# Patient Record
Sex: FEMALE | Race: WHITE | NOT HISPANIC OR LATINO | Employment: OTHER | URBAN - METROPOLITAN AREA
[De-identification: names, ages, dates, MRNs, and addresses within clinical notes are randomized per-mention and may not be internally consistent; named-entity substitution may affect disease eponyms.]

---

## 2017-09-19 ENCOUNTER — CONVERSION ENCOUNTER (OUTPATIENT)
Dept: MAMMOGRAPHY | Facility: HOSPITAL | Age: 72
End: 2017-09-19

## 2019-07-11 ENCOUNTER — HOSPITAL ENCOUNTER (OUTPATIENT)
Dept: MAMMOGRAPHY | Facility: HOSPITAL | Age: 74
Discharge: HOME | End: 2019-07-11
Attending: NURSE PRACTITIONER

## 2019-09-23 ENCOUNTER — TRANSCRIBE ORDERS (OUTPATIENT)
Dept: ADMINISTRATIVE | Facility: HOSPITAL | Age: 74
End: 2019-09-23

## 2019-09-23 DIAGNOSIS — H91.21 SUDDEN IDIOPATHIC HEARING LOSS OF RIGHT EAR, UNSPECIFIED HEARING STATUS ON CONTRALATERAL SIDE: ICD-10-CM

## 2019-09-23 DIAGNOSIS — H91.21 SUDDEN HEARING LOSS, RIGHT: Primary | ICD-10-CM

## 2019-10-08 ENCOUNTER — TRANSCRIBE ORDERS (OUTPATIENT)
Dept: ADMINISTRATIVE | Facility: HOSPITAL | Age: 74
End: 2019-10-08

## 2019-10-08 DIAGNOSIS — H91.21 SUDDEN HEARING LOSS, RIGHT: Primary | ICD-10-CM

## 2019-10-10 ENCOUNTER — HOSPITAL ENCOUNTER (OUTPATIENT)
Dept: MRI IMAGING | Facility: HOSPITAL | Age: 74
Discharge: HOME | End: 2019-10-10
Admitting: OTOLARYNGOLOGY

## 2019-10-10 DIAGNOSIS — H91.21 SUDDEN HEARING LOSS, RIGHT: ICD-10-CM

## 2019-10-10 PROCEDURE — 82565 ASSAY OF CREATININE: CPT

## 2019-10-10 PROCEDURE — 70553 MRI BRAIN STEM W/O & W/DYE: CPT

## 2019-10-10 PROCEDURE — 0 GADOBENATE DIMEGLUMINE 529 MG/ML SOLUTION: Performed by: OTOLARYNGOLOGY

## 2019-10-10 PROCEDURE — A9577 INJ MULTIHANCE: HCPCS | Performed by: OTOLARYNGOLOGY

## 2019-10-10 RX ADMIN — GADOBENATE DIMEGLUMINE 15 ML: 529 INJECTION, SOLUTION INTRAVENOUS at 09:48

## 2019-10-11 LAB — CREAT BLDA-MCNC: 0.8 MG/DL (ref 0.6–1.3)

## 2021-05-23 ENCOUNTER — TRANSCRIBE ORDERS (OUTPATIENT)
Dept: ADMINISTRATIVE | Facility: HOSPITAL | Age: 76
End: 2021-05-23

## 2021-05-23 DIAGNOSIS — Z12.31 OTHER SCREENING MAMMOGRAM: Primary | ICD-10-CM

## 2021-08-09 ENCOUNTER — HOSPITAL ENCOUNTER (OUTPATIENT)
Dept: MAMMOGRAPHY | Facility: HOSPITAL | Age: 76
Discharge: HOME | End: 2021-08-09
Admitting: FAMILY MEDICINE

## 2021-08-09 DIAGNOSIS — Z12.31 OTHER SCREENING MAMMOGRAM: ICD-10-CM

## 2021-08-09 PROCEDURE — 77067 SCR MAMMO BI INCL CAD: CPT | Performed by: RADIOLOGY

## 2021-08-09 PROCEDURE — 77063 BREAST TOMOSYNTHESIS BI: CPT | Performed by: RADIOLOGY

## 2021-08-09 PROCEDURE — 77067 SCR MAMMO BI INCL CAD: CPT

## 2021-08-09 PROCEDURE — 77063 BREAST TOMOSYNTHESIS BI: CPT

## 2022-09-12 ENCOUNTER — TRANSCRIBE ORDERS (OUTPATIENT)
Dept: ADMINISTRATIVE | Facility: HOSPITAL | Age: 77
End: 2022-09-12

## 2022-09-12 DIAGNOSIS — Z12.31 SCREENING MAMMOGRAM FOR BREAST CANCER: Primary | ICD-10-CM

## 2022-09-19 ENCOUNTER — HOSPITAL ENCOUNTER (OUTPATIENT)
Dept: MAMMOGRAPHY | Facility: HOSPITAL | Age: 77
Discharge: HOME | End: 2022-09-19
Admitting: FAMILY MEDICINE

## 2022-09-19 DIAGNOSIS — Z12.31 SCREENING MAMMOGRAM FOR BREAST CANCER: ICD-10-CM

## 2022-09-19 PROCEDURE — 77067 SCR MAMMO BI INCL CAD: CPT

## 2024-05-09 ENCOUNTER — TRANSCRIBE ORDERS (OUTPATIENT)
Dept: ADMINISTRATIVE | Facility: HOSPITAL | Age: 79
End: 2024-05-09
Payer: MEDICARE

## 2024-05-09 DIAGNOSIS — Z12.31 VISIT FOR SCREENING MAMMOGRAM: Primary | ICD-10-CM

## 2024-06-21 ENCOUNTER — TRANSCRIBE ORDERS (OUTPATIENT)
Dept: ADMINISTRATIVE | Facility: HOSPITAL | Age: 79
End: 2024-06-21
Payer: MEDICARE

## 2024-06-21 DIAGNOSIS — R09.89 OTHER SPECIFIED SYMPTOMS AND SIGNS INVOLVING THE CIRCULATORY AND RESPIRATORY SYSTEMS: Primary | ICD-10-CM

## 2024-07-23 ENCOUNTER — HOSPITAL ENCOUNTER (OUTPATIENT)
Dept: GENERAL RADIOLOGY | Facility: HOSPITAL | Age: 79
Discharge: HOME | End: 2024-07-23
Admitting: OTOLARYNGOLOGY
Payer: MEDICARE

## 2024-07-23 DIAGNOSIS — R13.10 DYSPHAGIA, UNSPECIFIED TYPE: Primary | ICD-10-CM

## 2024-07-23 DIAGNOSIS — R09.89 OTHER SPECIFIED SYMPTOMS AND SIGNS INVOLVING THE CIRCULATORY AND RESPIRATORY SYSTEMS: ICD-10-CM

## 2024-07-23 PROCEDURE — 74230 X-RAY XM SWLNG FUNCJ C+: CPT

## 2024-07-23 PROCEDURE — A9270 NON-COVERED ITEM OR SERVICE: HCPCS | Performed by: OTOLARYNGOLOGY

## 2024-07-23 PROCEDURE — 92611 MOTION FLUOROSCOPY/SWALLOW: CPT | Performed by: SPEECH-LANGUAGE PATHOLOGIST

## 2024-07-23 PROCEDURE — 63710000001 BARIUM SULFATE 98 % RECONSTITUTED SUSPENSION: Performed by: OTOLARYNGOLOGY

## 2024-07-23 PROCEDURE — 63710000001 BARIUM SULFATE 40 % RECONSTITUTED SUSPENSION: Performed by: OTOLARYNGOLOGY

## 2024-07-23 RX ADMIN — BARIUM SULFATE 4 ML: 980 POWDER, FOR SUSPENSION ORAL at 10:28

## 2024-07-23 RX ADMIN — BARIUM SULFATE 55 ML: 0.81 POWDER, FOR SUSPENSION ORAL at 10:28

## 2024-07-23 NOTE — MBS/VFSS/FEES
Outpatient Speech Language Pathology   Adult Swallow Initial Evaluation  [facility]     Patient Name: Deja Panchal  : 1945  MRN: [id number]  Today's Date: 2024         Visit Date: 2024   There is no problem list on file for this patient.    Pt with functional oropharyngeal swallow.  No aspiration, significant penetration or residue.  Collection of food and liquid noted in upper esophagus.  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  Discussed eating frequent small meals, soft/smooth foods well chewed, multiple swallows, alternating solids/liquids, remaining upright 1-2 hours after eating.  Refer back to ENT for management of suspected diverticulum; consider GI consult to assess full esophageal function.     No past medical history on file.     No past surgical history on file.      Visit Dx:     ICD-10-CM ICD-9-CM   1. Dysphagia, unspecified type  R13.10 787.20   2. Other specified symptoms and signs involving the circulatory and respiratory systems  R09.89 785.9     786.9            OP SLP Assessment/Plan - 24 1439          SLP Assessment    Functional Problems Swallowing  -SA    Impact on Function: Swallowing Risk of aspiration;Risk of pneumonia  -SA    Clinical Impression: Swallowing pharyngoesophageal dysphagia  -SA       SLP Plan    Frequency eval only  -SA              User Key  (r) = Recorded By, (t) = Taken By, (c) = Cosigned By      Initials Name Provider Type    Nighat Capps, SLP Speech and Language Pathologist                     SLP Adult Swallow Evaluation       Row Name 24 1000       Rehab Evaluation    Document Type evaluation  -SA    Subjective Information complains of  food sticking; air cutting off when eating/drinking  -SA    Patient Observations alert;cooperative  -SA    Patient Effort good  -SA    Symptoms Noted During/After Treatment none  -SA       General Information    Patient Profile Reviewed yes  -SA    Pertinent History Of Current Problem --  stoppage of  foods such as meat, bread, apples at level of sternal notch with sensation of loss of breath with these foods and water at times. Sensation for ~1 year, worsening recently.  -SA    Current Method of Nutrition regular textures;thin liquids  -SA    Precautions/Limitations, Vision WFL;for purposes of eval  -SA    Precautions/Limitations, Hearing WFL;for purposes of eval;other (see comments)  acute loss of hearing R ear  -SA    Prior Level of Function-Communication WFL  -SA    Prior Level of Function-Swallowing no diet consistency restrictions  -SA    Plans/Goals Discussed with patient;agreed upon  -SA    Barriers to Rehab none identified  -SA       Pain    Additional Documentation Pain Scale: Numbers Pre/Post-Treatment (Group)  -SA       Pain Scale: Numbers Pre/Post-Treatment    Pretreatment Pain Rating 0/10 - no pain  -SA    Posttreatment Pain Rating 0/10 - no pain  -SA       MBS/VFSS    Utensils Used spoon;cup;straw  -SA    Consistencies Trialed soft to chew textures;mixed consistency;pureed;thin liquids  -SA       MBS/VFSS Interpretation    Oral Prep Phase WFL  -SA    Oral Transit Phase WFL  -SA    Oral Residue WFL  -SA       Initiation of Pharyngeal Swallow    Pharyngeal Phase impaired pharyngeal phase of swallowing  -SA    Pharyngeal Phase, Comment --  Pt demonstrated no significant penetration or aspiration. No oral or pharyngeal residue noted. Pt presents with abnormality characteristic of a Zenkers diverticulum. Food and liquid trapped without notable clearance.  -SA       Esophageal Phase    Esophageal Phase other (see comments)  characteristics of pharyngeal pouch/Zenkers diverticulum with catching of both food and liquid.  -SA       SLP Communication to Radiology    Summary Statement Pt demonstrated no significant penetration or aspiration. No oral or pharyngeal residue noted. Pt presents with pharyngoesophageal dysphagia characteristic of a Zenkers diverticulum. Food and liquid trapped without notable  clearance.  -SA       SLP Evaluation Clinical Impression    SLP Swallowing Diagnosis other (see comments)  pharyngoesophageal dysphagia  -SA    Functional Impact risk of aspiration/pneumonia  -SA       Recommendations    Therapy Frequency (Swallow) PRN  -SA    Predicted Duration Therapy Intervention (Days) until discharge  -SA    SLP Diet Recommendation soft to chew textures;thin liquids;other (see comments)  multiple small meals, chew all food well, consider suppemental shakes as needed  -SA    Recommended Precautions and Strategies upright posture during/after eating;small bites of food and sips of liquid;multiple swallows per bite of food;multiple swallows per sip of liquid;alternate between small bites of food and sips of liquid  -SA    Oral Care Recommendations Oral Care BID/PRN  -SA    SLP Rec. for Method of Medication Administration meds crushed;with puree  -SA    Demonstrates Need for Referral to Another Service other (see comments);otolaryngology (ENT);gastroenterology  refer back to ENT for management of suspected diverticulum; consider GI consult to assess full esophageal function  -SA              User Key  (r) = Recorded By, (t) = Taken By, (c) = Cosigned By      Initials Name Provider Type    Nighat Capps SLP Speech and Language Pathologist                                   OP SLP Education       Row Name 07/23/24 9338       Education    Barriers to Learning No barriers identified  -    Education Provided Described results of evaluation;Patient expressed understanding of evaluation  -    Assessed Learning needs;Learning motivation  -SA    Learning Motivation Strong  -    Learning Method Explanation  -    Teaching Response Verbalized understanding  -              User Key  (r) = Recorded By, (t) = Taken By, (c) = Cosigned By      Initials Name Effective Dates    Nighat Capps SLP 01/11/24 -                                Time Calculation:   SLP Start Time: 1000    Therapy Charges  for Today       Code Description Service Date Service Provider Modifiers Qty    59508072682 HC ST MOTION FLUORO EVAL SWALLOW 6 7/23/2024 Nighat Madera, SLP GN 1                     SABRA Rivera  7/23/2024